# Patient Record
Sex: FEMALE | Race: WHITE | NOT HISPANIC OR LATINO | Employment: FULL TIME | URBAN - METROPOLITAN AREA
[De-identification: names, ages, dates, MRNs, and addresses within clinical notes are randomized per-mention and may not be internally consistent; named-entity substitution may affect disease eponyms.]

---

## 2023-01-30 ENCOUNTER — OFFICE VISIT (OUTPATIENT)
Dept: OBSTETRICS AND GYNECOLOGY | Facility: CLINIC | Age: 38
End: 2023-01-30
Payer: COMMERCIAL

## 2023-01-30 VITALS
BODY MASS INDEX: 22.47 KG/M2 | HEIGHT: 64 IN | SYSTOLIC BLOOD PRESSURE: 127 MMHG | DIASTOLIC BLOOD PRESSURE: 88 MMHG | WEIGHT: 131.63 LBS

## 2023-01-30 DIAGNOSIS — N61.0 MASTITIS: Primary | ICD-10-CM

## 2023-01-30 PROBLEM — N20.0 CALCULUS OF KIDNEY: Status: ACTIVE | Noted: 2023-01-30

## 2023-01-30 PROBLEM — E78.1 HYPERTRIGLYCERIDEMIA: Status: ACTIVE | Noted: 2023-01-30

## 2023-01-30 PROBLEM — F41.9 ANXIETY: Status: ACTIVE | Noted: 2023-01-30

## 2023-01-30 PROBLEM — F90.9 ATTENTION DEFICIT DISORDER OF ADULT WITH HYPERACTIVITY: Status: ACTIVE | Noted: 2023-01-30

## 2023-01-30 PROBLEM — M50.90 DISORDER OF INTERVERTEBRAL DISC OF CERVICAL SPINE: Status: ACTIVE | Noted: 2023-01-30

## 2023-01-30 PROBLEM — E11.9 TYPE 2 DIABETES MELLITUS: Status: ACTIVE | Noted: 2023-01-30

## 2023-01-30 PROCEDURE — 1160F PR REVIEW ALL MEDS BY PRESCRIBER/CLIN PHARMACIST DOCUMENTED: ICD-10-PCS | Mod: S$GLB,,, | Performed by: NURSE PRACTITIONER

## 2023-01-30 PROCEDURE — 99213 OFFICE O/P EST LOW 20 MIN: CPT | Mod: S$GLB,,, | Performed by: NURSE PRACTITIONER

## 2023-01-30 PROCEDURE — 3079F DIAST BP 80-89 MM HG: CPT | Mod: S$GLB,,, | Performed by: NURSE PRACTITIONER

## 2023-01-30 PROCEDURE — 3008F BODY MASS INDEX DOCD: CPT | Mod: S$GLB,,, | Performed by: NURSE PRACTITIONER

## 2023-01-30 PROCEDURE — 3074F PR MOST RECENT SYSTOLIC BLOOD PRESSURE < 130 MM HG: ICD-10-PCS | Mod: S$GLB,,, | Performed by: NURSE PRACTITIONER

## 2023-01-30 PROCEDURE — 3074F SYST BP LT 130 MM HG: CPT | Mod: S$GLB,,, | Performed by: NURSE PRACTITIONER

## 2023-01-30 PROCEDURE — 1160F RVW MEDS BY RX/DR IN RCRD: CPT | Mod: S$GLB,,, | Performed by: NURSE PRACTITIONER

## 2023-01-30 PROCEDURE — 1159F MED LIST DOCD IN RCRD: CPT | Mod: S$GLB,,, | Performed by: NURSE PRACTITIONER

## 2023-01-30 PROCEDURE — 3008F PR BODY MASS INDEX (BMI) DOCUMENTED: ICD-10-PCS | Mod: S$GLB,,, | Performed by: NURSE PRACTITIONER

## 2023-01-30 PROCEDURE — 3079F PR MOST RECENT DIASTOLIC BLOOD PRESSURE 80-89 MM HG: ICD-10-PCS | Mod: S$GLB,,, | Performed by: NURSE PRACTITIONER

## 2023-01-30 PROCEDURE — 99213 PR OFFICE/OUTPT VISIT, EST, LEVL III, 20-29 MIN: ICD-10-PCS | Mod: S$GLB,,, | Performed by: NURSE PRACTITIONER

## 2023-01-30 PROCEDURE — 1159F PR MEDICATION LIST DOCUMENTED IN MEDICAL RECORD: ICD-10-PCS | Mod: S$GLB,,, | Performed by: NURSE PRACTITIONER

## 2023-01-30 RX ORDER — TRAMADOL HYDROCHLORIDE 50 MG/1
50 TABLET ORAL
COMMUNITY
Start: 2023-01-20 | End: 2023-07-19

## 2023-01-30 RX ORDER — DEXTROAMPHETAMINE SULFATE, DEXTROAMPHETAMINE SACCHARATE, AMPHETAMINE SULFATE AND AMPHETAMINE ASPARTATE 7.5; 7.5; 7.5; 7.5 MG/1; MG/1; MG/1; MG/1
CAPSULE, EXTENDED RELEASE ORAL EVERY MORNING
COMMUNITY
Start: 2022-09-23

## 2023-01-30 RX ORDER — FLUCONAZOLE 150 MG/1
TABLET ORAL
Qty: 2 TABLET | Refills: 3 | Status: SHIPPED | OUTPATIENT
Start: 2023-01-30 | End: 2023-05-24

## 2023-01-30 RX ORDER — AMOXICILLIN AND CLAVULANATE POTASSIUM 875; 125 MG/1; MG/1
1 TABLET, FILM COATED ORAL 2 TIMES DAILY
Qty: 20 TABLET | Refills: 0 | Status: SHIPPED | OUTPATIENT
Start: 2023-01-30 | End: 2023-02-09

## 2023-01-30 RX ORDER — ORAL SEMAGLUTIDE 3 MG/1
3 TABLET ORAL
COMMUNITY
Start: 2022-08-02 | End: 2023-07-19

## 2023-01-30 RX ORDER — METFORMIN HYDROCHLORIDE 500 MG/1
500 TABLET ORAL
COMMUNITY
Start: 2023-01-20 | End: 2024-01-15

## 2023-01-30 RX ORDER — DEXTROAMPHETAMINE SULFATE, DEXTROAMPHETAMINE SACCHARATE, AMPHETAMINE ASPARTATE MONOHYDRATE, AND AMPHETAMINE SULFATE 9.375; 9.375; 9.375; 9.375 MG/1; MG/1; MG/1; MG/1
1 CAPSULE, EXTENDED RELEASE ORAL EVERY MORNING
COMMUNITY
Start: 2023-01-27

## 2023-01-30 RX ORDER — ROSUVASTATIN CALCIUM 20 MG/1
20 TABLET, COATED ORAL
COMMUNITY
Start: 2023-01-20 | End: 2023-07-19

## 2023-01-30 NOTE — PROGRESS NOTES
Cheryl Salvador is a 37 y.o.  who presents today for GYN problem visit.     C/C:   Chief Complaint   Patient presents with    Breast Pain     Patient is here for breast pain and discomfort.       HPI: Has GYN related concerns including breast tenderness and red area on left breast since last Tuesday.  She states it is improving some.  She denies any drainage.     MENSTRUAL HISTORY  Patient's last menstrual period was 2023..      PAP HISTORY: last pap 2019,   history of abnormal pap smear        Review of patient's allergies indicates:   Allergen Reactions    Ciprofloxacin Hives     Pt. had small welt near IV site afer iniation of IVPB.      Hydrocodone      Other reaction(s): Bradycardia, nausea    Hydrocodone-acetaminophen Nausea And Vomiting     Low bp    Oxycodone      Other reaction(s): Bradycardia, Nausea    Oxycodone-acetaminophen Nausea And Vomiting     Low bp     Past Medical History:   Diagnosis Date    Abnormal Pap smear of cervix     ADHD     Kidney stones     Mixed hyperlipidemia     Type 2 diabetes mellitus without complications      Past Surgical History:   Procedure Laterality Date    BILATERAL TUBAL LIGATION  2012    CYSTOSCOPY W/ URETERAL STENT PLACEMENT       Past Surgical History:   Procedure Laterality Date    BILATERAL TUBAL LIGATION  2012    CYSTOSCOPY W/ URETERAL STENT PLACEMENT       OB History    Para Term  AB Living   3 3 3         SAB IAB Ectopic Multiple Live Births                  # Outcome Date GA Lbr [place]cadio/2nd Weight Sex Delivery Anes PTL Lv   3 Term            2 Term            1 Term              OB History          3    Para   3    Term   3            AB        Living             SAB        IAB        Ectopic        Multiple        Live Births                   History reviewed. No pertinent family history.  Social History     Substance and Sexual Activity   Sexual Activity Yes       Primary Doctor No          ROS  Review of Systems  "  Constitutional:  Negative for fatigue, fever and unexpected weight change.   HENT:  Negative for nasal congestion.    Respiratory:  Negative for cough and shortness of breath.    Cardiovascular:  Negative for chest pain, palpitations and leg swelling.   Gastrointestinal:  Negative for abdominal pain, constipation, diarrhea and nausea.   Endocrine: Positive for diabetes. Negative for hair loss and hot flashes.   Genitourinary:  Negative for dysmenorrhea, dyspareunia, dysuria, menorrhagia, menstrual problem, pelvic pain, vaginal discharge and vaginal dryness.   Musculoskeletal:  Negative for arthralgias, back pain and myalgias.   Integumentary:  Positive for breast skin changes and breast tenderness. Negative for rash, breast mass and nipple discharge.   Neurological:  Negative for seizures, syncope and headaches.   Hematological:  Negative for adenopathy. Does not bruise/bleed easily.   Psychiatric/Behavioral:  Negative for depression and sleep disturbance. The patient is not nervous/anxious.    Breast: Positive for breast self exam, mastodynia, skin changes and tenderness.Negative for lump, mass and nipple discharge    OBJECTIVE:  /88   Ht 5' 4" (1.626 m)   Wt 59.7 kg (131 lb 9.6 oz)   LMP 01/09/2023   BMI 22.59 kg/m²   Physical Exam  Vitals reviewed. Exam conducted with a chaperone present.   Constitutional:       Appearance: Normal appearance. She is normal weight.   HENT:      Head: Normocephalic.      Nose: Nose normal.   Cardiovascular:      Rate and Rhythm: Normal rate.   Pulmonary:      Effort: Pulmonary effort is normal.   Chest:       Musculoskeletal:         General: Normal range of motion.      Cervical back: Normal range of motion.   Skin:     General: Skin is warm.   Neurological:      General: No focal deficit present.      Mental Status: She is alert and oriented to person, place, and time.   Psychiatric:         Mood and Affect: Mood normal.         Behavior: Behavior normal.        A:  "   37 y.o. female  for GYN problem visit  Body mass index is 22.59 kg/m².  Patient Active Problem List   Diagnosis    Anxiety    Attention deficit disorder of adult with hyperactivity    Calculus of kidney    Chronic migraine    Disorder of intervertebral disc of cervical spine    Hypertriglyceridemia    Type 2 diabetes mellitus         P:  Mastitis    Other orders  -     amoxicillin-clavulanate 875-125mg (AUGMENTIN) 875-125 mg per tablet; Take 1 tablet by mouth 2 (two) times daily. for 10 days  Dispense: 20 tablet; Refill: 0  -     fluconazole (DIFLUCAN) 150 MG Tab; Take one by mouth now and may repeat in 72 hours.  Dispense: 2 tablet; Refill: 3    Will treat area with antibiotics.  Diflucan given prophylactically.  She will follow-up in about a month to assess her breast and to have annual exam.  She is overdue for a Pap smear.    ----Continue annual exams, return then or sooner prn      Follow up in about 1 month (around 2023) for well woman.

## 2023-05-24 ENCOUNTER — OFFICE VISIT (OUTPATIENT)
Dept: OBSTETRICS AND GYNECOLOGY | Facility: CLINIC | Age: 38
End: 2023-05-24
Payer: COMMERCIAL

## 2023-05-24 VITALS
SYSTOLIC BLOOD PRESSURE: 143 MMHG | HEART RATE: 93 BPM | BODY MASS INDEX: 22.72 KG/M2 | HEIGHT: 65 IN | WEIGHT: 136.38 LBS | DIASTOLIC BLOOD PRESSURE: 88 MMHG

## 2023-05-24 DIAGNOSIS — Z01.419 WELL WOMAN EXAM WITH ROUTINE GYNECOLOGICAL EXAM: Primary | ICD-10-CM

## 2023-05-24 PROCEDURE — 3008F BODY MASS INDEX DOCD: CPT | Mod: S$GLB,,, | Performed by: NURSE PRACTITIONER

## 2023-05-24 PROCEDURE — 3077F PR MOST RECENT SYSTOLIC BLOOD PRESSURE >= 140 MM HG: ICD-10-PCS | Mod: S$GLB,,, | Performed by: NURSE PRACTITIONER

## 2023-05-24 PROCEDURE — 3079F DIAST BP 80-89 MM HG: CPT | Mod: S$GLB,,, | Performed by: NURSE PRACTITIONER

## 2023-05-24 PROCEDURE — 3077F SYST BP >= 140 MM HG: CPT | Mod: S$GLB,,, | Performed by: NURSE PRACTITIONER

## 2023-05-24 PROCEDURE — 3008F PR BODY MASS INDEX (BMI) DOCUMENTED: ICD-10-PCS | Mod: S$GLB,,, | Performed by: NURSE PRACTITIONER

## 2023-05-24 PROCEDURE — 3079F PR MOST RECENT DIASTOLIC BLOOD PRESSURE 80-89 MM HG: ICD-10-PCS | Mod: S$GLB,,, | Performed by: NURSE PRACTITIONER

## 2023-05-24 PROCEDURE — 88175 CYTOPATH C/V AUTO FLUID REDO: CPT | Performed by: NURSE PRACTITIONER

## 2023-05-24 PROCEDURE — 1160F RVW MEDS BY RX/DR IN RCRD: CPT | Mod: S$GLB,,, | Performed by: NURSE PRACTITIONER

## 2023-05-24 PROCEDURE — 1160F PR REVIEW ALL MEDS BY PRESCRIBER/CLIN PHARMACIST DOCUMENTED: ICD-10-PCS | Mod: S$GLB,,, | Performed by: NURSE PRACTITIONER

## 2023-05-24 PROCEDURE — 99395 PREV VISIT EST AGE 18-39: CPT | Mod: S$GLB,,, | Performed by: NURSE PRACTITIONER

## 2023-05-24 PROCEDURE — 1159F PR MEDICATION LIST DOCUMENTED IN MEDICAL RECORD: ICD-10-PCS | Mod: S$GLB,,, | Performed by: NURSE PRACTITIONER

## 2023-05-24 PROCEDURE — 1159F MED LIST DOCD IN RCRD: CPT | Mod: S$GLB,,, | Performed by: NURSE PRACTITIONER

## 2023-05-24 PROCEDURE — 99395 PR PREVENTIVE VISIT,EST,18-39: ICD-10-PCS | Mod: S$GLB,,, | Performed by: NURSE PRACTITIONER

## 2023-05-24 RX ORDER — CHOLECALCIFEROL (VITAMIN D3) 125 MCG
125 CAPSULE ORAL
COMMUNITY
Start: 2023-02-28 | End: 2023-08-27

## 2023-05-24 NOTE — PROGRESS NOTES
"CC: Well woman exam    Cheryl Salvador is a 37 y.o. female  presents for well woman exam.  LMP: Patient's last menstrual period was 2023 (exact date)..   Patients last pap was 2020.  Last wellness labs were done , scheduled next week.   Birth control BTL.  SBE Sometimes. PCP-Dr. Caro Riggins.  No issues, problems, or complaints.    Chief Complaint   Patient presents with    Well Woman        Past Medical History:   Diagnosis Date    Abnormal Pap smear of cervix     ADHD     Kidney stones     Mixed hyperlipidemia     Type 2 diabetes mellitus without complications      Past Surgical History:   Procedure Laterality Date    BILATERAL TUBAL LIGATION      CYSTOSCOPY W/ URETERAL STENT PLACEMENT      ENDOMETRIAL ABLATION       Social History     Socioeconomic History    Marital status:    Tobacco Use    Smoking status: Never    Smokeless tobacco: Never   Substance and Sexual Activity    Alcohol use: Not Currently    Drug use: Never    Sexual activity: Yes     Birth control/protection: None     History reviewed. No pertinent family history.  OB History          3    Para   3    Term   3            AB        Living             SAB        IAB        Ectopic        Multiple        Live Births                     BP (!) 143/88   Pulse 93   Ht 5' 5" (1.651 m)   Wt 61.9 kg (136 lb 6.4 oz)   LMP 2023 (Exact Date)   BMI 22.70 kg/m²       ROS:  GENERAL: Denies weight gain or weight loss. Feeling well overall.   SKIN: Denies rash or lesions.   HEAD: Denies head injury or headache.   NODES: Denies enlarged lymph nodes.   CHEST: Denies chest pain or shortness of breath.   CARDIOVASCULAR: Denies palpitations or left sided chest pain.   ABDOMEN: No abdominal pain, constipation, diarrhea, nausea, vomiting or rectal bleeding.   URINARY: No frequency, dysuria, hematuria, or burning on urination.  REPRODUCTIVE: See HPI.   BREASTS: The patient performs breast self-examination and " denies pain, lumps, or nipple discharge.   HEMATOLOGIC: No easy bruisability or excessive bleeding.   MUSCULOSKELETAL: Denies joint pain or swelling.   NEUROLOGIC: Denies syncope or weakness.   PSYCHIATRIC: Denies depression, anxiety or mood swings.    PHYSICAL EXAM:  APPEARANCE: Well nourished, well developed, in no acute distress.  AFFECT: WNL, alert and oriented x 3  SKIN: No acne or hirsutism  NECK: Neck symmetric without masses or thyromegaly  NODES: No inguinal, cervical, or axillary lymph node enlargement  CHEST: Good respiratory effect  ABDOMEN: Soft.  No tenderness or masses.  No hepatosplenomegaly.  No hernias.  BREASTS: Symmetrical, no skin changes or visible lesions.  No palpable masses, nipple discharge bilaterally.  PELVIC: Normal external genitalia without lesions.  Normal urethral meatus.  Vagina well rugated without lesions or discharge.  Cervix pink, without lesions, discharge or tenderness.  No significant cystocele or rectocele.  Bimanual exam shows uterus to be normal size, regular, mobile and nontender.  Adnexa without masses or tenderness.    EXTREMITIES: No edema.    Well woman exam with routine gynecological exam  -     Liquid-Based Pap Smear, Screening      She is scheduled for labs with her primary care next week.      Patient was counseled today on A.C.S. Pap guidelines and recommendations for yearly pelvic exams, mammograms and monthly self breast exams; to see her PCP for other health maintenance.     Follow up in about 1 year (around 5/24/2024).

## 2023-05-31 LAB
FINAL PATHOLOGIC DIAGNOSIS: NORMAL
Lab: NORMAL